# Patient Record
Sex: FEMALE | Race: WHITE | Employment: STUDENT | URBAN - METROPOLITAN AREA
[De-identification: names, ages, dates, MRNs, and addresses within clinical notes are randomized per-mention and may not be internally consistent; named-entity substitution may affect disease eponyms.]

---

## 2024-11-09 ENCOUNTER — OFFICE VISIT (OUTPATIENT)
Age: 24
End: 2024-11-09

## 2024-11-09 VITALS
DIASTOLIC BLOOD PRESSURE: 92 MMHG | OXYGEN SATURATION: 97 % | TEMPERATURE: 99.6 F | WEIGHT: 176 LBS | RESPIRATION RATE: 16 BRPM | SYSTOLIC BLOOD PRESSURE: 131 MMHG | HEART RATE: 111 BPM

## 2024-11-09 DIAGNOSIS — J35.1 ENLARGED TONSILS: ICD-10-CM

## 2024-11-09 DIAGNOSIS — R03.0 ELEVATED BLOOD PRESSURE READING: ICD-10-CM

## 2024-11-09 DIAGNOSIS — R05.1 ACUTE COUGH: ICD-10-CM

## 2024-11-09 DIAGNOSIS — J06.9 UPPER RESPIRATORY INFECTION, VIRAL: Primary | ICD-10-CM

## 2024-11-09 LAB
INFLUENZA A ANTIGEN, POC: NORMAL
INFLUENZA B ANTIGEN, POC: NORMAL
STREP PYOGENES DNA, POC: NEGATIVE
VALID INTERNAL CONTROL, POC: NORMAL

## 2024-11-09 RX ORDER — DEXTROMETHORPHAN HYDROBROMIDE AND PROMETHAZINE HYDROCHLORIDE 15; 6.25 MG/5ML; MG/5ML
5 SYRUP ORAL 4 TIMES DAILY PRN
Qty: 140 ML | Refills: 0 | Status: SHIPPED | OUTPATIENT
Start: 2024-11-09 | End: 2024-11-16

## 2024-11-09 ASSESSMENT — ENCOUNTER SYMPTOMS
GASTROINTESTINAL NEGATIVE: 1
COUGH: 1
ALLERGIC/IMMUNOLOGIC NEGATIVE: 1
RHINORRHEA: 1
EYES NEGATIVE: 1

## 2024-11-09 NOTE — PROGRESS NOTES
2024   Fadi Sanchez (: 2000) is a 23 y.o. female, New patient, here for evaluation of the following chief complaint(s):  Cough (Cough and fever x 5 days, swollen lymph nodes, fatigue. Taking OTC meds to help with symptoms.)     ASSESSMENT/PLAN:  Below is the assessment and plan developed based on review of pertinent history, physical exam, labs, studies, and medications.  1. Upper respiratory infection, viral  2. Elevated blood pressure reading  -     Ambulatory referral to Internal Medicine  3. Enlarged tonsils  -     AMB POC STREP GO A DIRECT, DNA PROBE  4. Acute cough  -     POCT Influenza A/B Antigen  -     promethazine-dextromethorphan (PROMETHAZINE-DM) 6.25-15 MG/5ML syrup; Take 5 mLs by mouth 4 times daily as needed for Cough, Disp-140 mL, R-0Normal      Handout given with care instructions  2. OTC for symptom management. Increase fluid intake, ensure adequate nutritional intake.  3. Follow up with PCP as needed.  4. Go to ED with development of any acute symptoms.     Follow up:  Return if symptoms worsen or fail to improve.  Follow up immediately for any new, worsening or changes or if symptoms are not improving over the next 5-7 days.     SUBJECTIVE/OBJECTIVE:  HPI     Fadi Sanchez is a 23 y.o. female who complains of congestion, sore throat, post nasal drip, myalgias, headache, fever, chills, and pain while swallowing for 5 days. Patient denies recent travels.  She denies a history of chest pain, dizziness, nausea, shortness of breath, vomiting, and weakness and has a history of asthma.  Patient has environmental/ seasonal allergies. Patient denies exposure to smoke / cigarettes.Patient denies exposure to  sick contacts . Patient also noted that OTC remedies did not help.      There are no diagnoses linked to this encounter.    Cough (Cough and fever x 5 days, swollen lymph nodes, fatigue. Taking OTC meds to help with symptoms.)      Results for orders placed or performed in visit on